# Patient Record
Sex: FEMALE | Race: WHITE | URBAN - METROPOLITAN AREA
[De-identification: names, ages, dates, MRNs, and addresses within clinical notes are randomized per-mention and may not be internally consistent; named-entity substitution may affect disease eponyms.]

---

## 2024-01-02 ENCOUNTER — APPOINTMENT (OUTPATIENT)
Dept: URBAN - METROPOLITAN AREA CLINIC 252 | Age: 79
Setting detail: DERMATOLOGY
End: 2024-01-04

## 2024-01-02 VITALS — WEIGHT: 138 LBS | HEIGHT: 63 IN | RESPIRATION RATE: 18 BRPM

## 2024-01-02 DIAGNOSIS — L57.0 ACTINIC KERATOSIS: ICD-10-CM

## 2024-01-02 DIAGNOSIS — D18.0 HEMANGIOMA: ICD-10-CM

## 2024-01-02 PROBLEM — C44.329 SQUAMOUS CELL CARCINOMA OF SKIN OF OTHER PARTS OF FACE: Status: ACTIVE | Noted: 2024-01-02

## 2024-01-02 PROBLEM — D18.01 HEMANGIOMA OF SKIN AND SUBCUTANEOUS TISSUE: Status: ACTIVE | Noted: 2024-01-02

## 2024-01-02 PROCEDURE — OTHER LIQUID NITROGEN: OTHER

## 2024-01-02 PROCEDURE — OTHER COUNSELING: OTHER

## 2024-01-02 PROCEDURE — 99203 OFFICE O/P NEW LOW 30 MIN: CPT | Mod: 25

## 2024-01-02 PROCEDURE — OTHER PHOTO-DOCUMENTATION: OTHER

## 2024-01-02 PROCEDURE — 17000 DESTRUCT PREMALG LESION: CPT

## 2024-01-02 ASSESSMENT — LOCATION SIMPLE DESCRIPTION DERM
LOCATION SIMPLE: RIGHT EYEBROW
LOCATION SIMPLE: LEFT FOREHEAD

## 2024-01-02 ASSESSMENT — LOCATION ZONE DERM
LOCATION ZONE: FACE
LOCATION ZONE: FACE

## 2024-01-02 ASSESSMENT — LOCATION DETAILED DESCRIPTION DERM
LOCATION DETAILED: RIGHT CENTRAL EYEBROW
LOCATION DETAILED: LEFT SUPERIOR FOREHEAD

## 2024-01-02 NOTE — PROCEDURE: COUNSELING
Detail Level: Detailed
Patient Specific Counseling (Will Not Stick From Patient To Patient): Patient was given a field treatment by Dr Durham, Rx: imiquimod for entire face twice weekly for a duration of time
Patient Specific Counseling (Will Not Stick From Patient To Patient): ***Patient states that she would like a 2nd opinion, doesn’t want SRT as was recommended to her.   Discussed the option to have MOHS procedure performed, which discussed what this entails as well as expectations.\\nROI was signed and sent to Dr Hdez office to obtain OV records, pathology and pictures.  Will forward to Dr Tilley for review and MOHS consult.

## 2024-01-02 NOTE — HPI: SKIN LESION (BASAL CELL CARCINOMA)
How Severe Is Your Skin Cancer?: mild
Is This A New Presentation, Or A Follow-Up?: New Basal Cell Carcinoma
Additional History: **** Dr. Snyder office recommended 40 SRT sessions , she would like a second opinion , wasn’t happy with the experience \\n**** was not recommended Mohs because of the area Dr. Snyder